# Patient Record
Sex: FEMALE | Race: WHITE | NOT HISPANIC OR LATINO
[De-identification: names, ages, dates, MRNs, and addresses within clinical notes are randomized per-mention and may not be internally consistent; named-entity substitution may affect disease eponyms.]

---

## 2019-03-07 PROBLEM — Z00.00 ENCOUNTER FOR PREVENTIVE HEALTH EXAMINATION: Status: ACTIVE | Noted: 2019-03-07

## 2019-03-08 ENCOUNTER — RECORD ABSTRACTING (OUTPATIENT)
Age: 69
End: 2019-03-08

## 2019-03-08 DIAGNOSIS — G43.909 MIGRAINE, UNSPECIFIED, NOT INTRACTABLE, W/OUT STATUS MIGRAINOSUS: ICD-10-CM

## 2019-03-08 DIAGNOSIS — Z80.8 FAMILY HISTORY OF MALIGNANT NEOPLASM OF OTHER ORGANS OR SYSTEMS: ICD-10-CM

## 2019-03-08 DIAGNOSIS — Z80.1 FAMILY HISTORY OF MALIGNANT NEOPLASM OF TRACHEA, BRONCHUS AND LUNG: ICD-10-CM

## 2019-03-08 DIAGNOSIS — F41.1 GENERALIZED ANXIETY DISORDER: ICD-10-CM

## 2019-03-08 DIAGNOSIS — Z80.3 FAMILY HISTORY OF MALIGNANT NEOPLASM OF BREAST: ICD-10-CM

## 2019-03-08 RX ORDER — ALBUTEROL SULFATE 90 UG/1
108 (90 BASE) AEROSOL, METERED RESPIRATORY (INHALATION)
Refills: 0 | Status: ACTIVE | COMMUNITY

## 2019-03-08 RX ORDER — TOPIRAMATE 25 MG/1
25 TABLET, COATED ORAL
Refills: 0 | Status: ACTIVE | COMMUNITY

## 2019-03-08 RX ORDER — ALPRAZOLAM 0.5 MG/1
0.5 TABLET ORAL
Refills: 0 | Status: ACTIVE | COMMUNITY

## 2019-03-08 RX ORDER — MOMETASONE FUROATE MONOHYDRATE 50 UG/1
50 SPRAY, METERED NASAL
Refills: 0 | Status: ACTIVE | COMMUNITY

## 2019-03-08 RX ORDER — ESCITALOPRAM OXALATE 10 MG/1
10 TABLET, FILM COATED ORAL
Refills: 0 | Status: ACTIVE | COMMUNITY

## 2019-03-08 RX ORDER — FLUTICASONE PROPIONATE 50 MCG
50 SPRAY, SUSPENSION NASAL
Refills: 0 | Status: ACTIVE | COMMUNITY

## 2019-03-12 ENCOUNTER — APPOINTMENT (OUTPATIENT)
Dept: GASTROENTEROLOGY | Facility: CLINIC | Age: 69
End: 2019-03-12
Payer: MEDICARE

## 2019-03-12 VITALS
SYSTOLIC BLOOD PRESSURE: 118 MMHG | HEART RATE: 66 BPM | DIASTOLIC BLOOD PRESSURE: 74 MMHG | HEIGHT: 65 IN | OXYGEN SATURATION: 97 %

## 2019-03-12 DIAGNOSIS — R10.32 RIGHT LOWER QUADRANT PAIN: ICD-10-CM

## 2019-03-12 DIAGNOSIS — Z83.79 FAMILY HISTORY OF OTHER DISEASES OF THE DIGESTIVE SYSTEM: ICD-10-CM

## 2019-03-12 DIAGNOSIS — Z87.891 PERSONAL HISTORY OF NICOTINE DEPENDENCE: ICD-10-CM

## 2019-03-12 DIAGNOSIS — R10.31 RIGHT LOWER QUADRANT PAIN: ICD-10-CM

## 2019-03-12 PROCEDURE — 36415 COLL VENOUS BLD VENIPUNCTURE: CPT

## 2019-03-12 PROCEDURE — 99204 OFFICE O/P NEW MOD 45 MIN: CPT | Mod: 25

## 2019-03-12 RX ORDER — LEVOTHYROXINE SODIUM 0.07 MG/1
75 TABLET ORAL
Refills: 0 | Status: ACTIVE | COMMUNITY

## 2019-03-12 RX ORDER — IBUPROFEN 200 MG
600 CAPSULE ORAL
Refills: 0 | Status: ACTIVE | COMMUNITY

## 2019-03-12 RX ORDER — LIOTHYRONINE SODIUM 5 UG/1
5 TABLET ORAL
Refills: 0 | Status: ACTIVE | COMMUNITY

## 2019-03-12 RX ORDER — KETOTIFEN FUMARATE 0.25 MG/ML
0.03 SOLUTION OPHTHALMIC
Refills: 0 | Status: DISCONTINUED | COMMUNITY
End: 2019-03-12

## 2019-03-13 ENCOUNTER — RESULT REVIEW (OUTPATIENT)
Age: 69
End: 2019-03-13

## 2019-03-13 LAB
ALBUMIN SERPL ELPH-MCNC: 4.5 G/DL
ALP BLD-CCNC: 94 U/L
ALT SERPL-CCNC: 12 U/L
ANION GAP SERPL CALC-SCNC: 11 MMOL/L
AST SERPL-CCNC: 22 U/L
BASOPHILS # BLD AUTO: 0.05 K/UL
BASOPHILS NFR BLD AUTO: 0.8 %
BILIRUB SERPL-MCNC: 0.5 MG/DL
BUN SERPL-MCNC: 16 MG/DL
CALCIUM SERPL-MCNC: 10 MG/DL
CHLORIDE SERPL-SCNC: 99 MMOL/L
CO2 SERPL-SCNC: 24 MMOL/L
CREAT SERPL-MCNC: 0.6 MG/DL
EOSINOPHIL # BLD AUTO: 0.04 K/UL
EOSINOPHIL NFR BLD AUTO: 0.7 %
HCT VFR BLD CALC: 38.8 %
HGB BLD-MCNC: 12 G/DL
IMM GRANULOCYTES NFR BLD AUTO: 0.2 %
LPL SERPL-CCNC: 41 U/L
LYMPHOCYTES # BLD AUTO: 1.12 K/UL
LYMPHOCYTES NFR BLD AUTO: 18.2 %
MAN DIFF?: NORMAL
MCHC RBC-ENTMCNC: 29.1 PG
MCHC RBC-ENTMCNC: 30.9 GM/DL
MCV RBC AUTO: 93.9 FL
MONOCYTES # BLD AUTO: 0.5 K/UL
MONOCYTES NFR BLD AUTO: 8.1 %
NEUTROPHILS # BLD AUTO: 4.43 K/UL
NEUTROPHILS NFR BLD AUTO: 72 %
PLATELET # BLD AUTO: 241 K/UL
POTASSIUM SERPL-SCNC: 5.2 MMOL/L
PROT SERPL-MCNC: 7.5 G/DL
RBC # BLD: 4.13 M/UL
RBC # FLD: 13.3 %
SODIUM SERPL-SCNC: 134 MMOL/L
WBC # FLD AUTO: 6.15 K/UL

## 2019-03-15 NOTE — HISTORY OF PRESENT ILLNESS
[FreeTextEntry1] : 69 yo f. \par 2 mos ago, she developed severe abd pain, low abdomen/pelvis. \par she saw gyn\par told to take a lot of miralax\par abdominal and pelvic/TV US imaging showed kidney stones, and possible hepatic hemangiomas and myomatous uterus. \par and she was told to see GI. \par She saw Dr. Campo at [facility], \par scheduled for EGD March 23. \par She was told to take 32 g of fiber daily, 8 servings of benefiber and 1 serving of miralax daily x 2 weeks, This caused severe cramping, distension and diarrhea\par She had URI end of Jan/feb, severe coughing, \par She now cant lie on her stomach due to pain. \par She has tenderness around her naval and feels a bump above her naval. \par she had blood work with Dr. Campo, \par she tried low fodmap for last 1.5 months, cut out gluten/dairy, cruciferous vegetables. \par she has "always had stomach issues" and has not had red meat/fried foods since age 21. \par \par she has a h/o GERD. \par she called nutritoinist\par no prior egd\par colonoscopy dr. ziegler 2000, 2010, due in 10 years. \par fodmap helpful for bowel regularity, 1 bm per day, and GERD.  \par previously she had alternating constipation /diarrhea was her normal bowel pattern. \par no on fodmap diet, some urgency 1-3 bm per day, formed stool. \par no brbpr/melena\par weight stable, appetite stable. \par no nausea/vomting\par pain is better after bm.

## 2019-03-15 NOTE — ASSESSMENT
[FreeTextEntry1] : new constipation and bloating, unclear etiology.\par recommend CT and if unrevealing, EGD and colonoscopy. \par Risks (including but not limited to sedation risks, infection, bleeding, perforation, possibility of missed lesions), benefits and alternatives to procedure, including not doing the procedure, were discussed with patient. Patient understood and agreed to proceed with egd/colonoscopy. \par EGD/Colonoscopy preparation instructions reviewed with patient.\par Continue daily MiraLAX. \par

## 2019-03-15 NOTE — PHYSICAL EXAM
[General Appearance - Alert] : alert [General Appearance - In No Acute Distress] : in no acute distress [Sclera] : the sclera and conjunctiva were normal [Outer Ear] : the ears and nose were normal in appearance [Hearing Threshold Finger Rub Not [place]] : hearing was normal [Neck Appearance] : the appearance of the neck was normal [] : no respiratory distress [Apical Impulse] : the apical impulse was normal [Abdomen Soft] : soft [FreeTextEntry1] : diffuse mild tenderness to palpation, worse at umbilicus, small reducible umbilical hernia, no r/g [No CVA Tenderness] : no ~M costovertebral angle tenderness [Abnormal Walk] : normal gait [Skin Color & Pigmentation] : normal skin color and pigmentation [No Focal Deficits] : no focal deficits [Oriented To Time, Place, And Person] : oriented to person, place, and time

## 2019-03-15 NOTE — REASON FOR VISIT
[Consultation] : a consultation visit [FreeTextEntry1] : Patient seen at the request of Dr. Lopez for abdominal complaints

## 2019-03-20 ENCOUNTER — APPOINTMENT (OUTPATIENT)
Dept: GASTROENTEROLOGY | Facility: CLINIC | Age: 69
End: 2019-03-20

## 2020-05-08 ENCOUNTER — APPOINTMENT (OUTPATIENT)
Dept: GASTROENTEROLOGY | Facility: CLINIC | Age: 70
End: 2020-05-08
Payer: MEDICARE

## 2020-05-08 DIAGNOSIS — C56.9 MALIGNANT NEOPLASM OF UNSPECIFIED OVARY: ICD-10-CM

## 2020-05-08 DIAGNOSIS — K21.9 GASTRO-ESOPHAGEAL REFLUX DISEASE W/OUT ESOPHAGITIS: ICD-10-CM

## 2020-05-08 DIAGNOSIS — R19.7 DIARRHEA, UNSPECIFIED: ICD-10-CM

## 2020-05-08 PROCEDURE — 99214 OFFICE O/P EST MOD 30 MIN: CPT | Mod: 95

## 2020-05-08 NOTE — ASSESSMENT
[FreeTextEntry1] : 1. aubacute diarrhea, of recent onset, two weeks ago, with improvement, but not yet back to normal\par \par 2.  now perhaps one loose bm per day, maybe two, soft, unformed, no longer watery, and the bleeding times one was minor, and confined to the tissue\par \par 3.  patient's  is the only other family member, and he has been well\par \par 4.  appetite is ok, feels reasonably well, but appetite in general since debulking surgery is better\par \par had been on bland diet, as per nurse practitioner,  \par \par plan\par 1.  use of a probiotic, which was started five days ago..using one at [facility]\par \par 2.  gradually ease up on dietrary restrictions\par \par 3.  frequency of esophageal reflux has increased...it is experiennced frequently, bad reflux only once every six months, and mild reflux symptoms are experienced much more commonly, substernal upper substernal burning, and it is brought on coffee can do it, or tea, herbal tea, red hot peppers, onions garlic, etc.\par \par 4.  prilosec helped her reflux, ranitidine helped, but not as much\par \par 5.  gaviscon helps, and gets rid of symptoms rapidly.\par \par \par telehealth visit, thirty minutes

## 2020-05-08 NOTE — HISTORY OF PRESENT ILLNESS
[FreeTextEntry1] : THis is a [age] year old patient, with history of ovarian carcinoma and peritoneal carcinomatosis...\par \par referred by  Andre Mckinley, a patient\par \par last colonoscopy with me in 2010..\par \par dr Tez Mcdaniel diagnosed ovarian cancer and peritoneal carcinomatosis\par \par chemo done at [facility]..\par \par then a debulking surgery, in july 2019...\par \par doing very well..\par \par labs...\par ca123; 8, had been over 1000\par \par stool culture neg,\par and c diff was negative\par \par two weeks of diarrhea\par \par some bright red blood two days \par \par blood was on toilet tissue only..\par \par the diarrhea began as water, several days of about three bms per day, quite watery, tooik imodium then pepto and it made her constipated.\par \par magnesium tabs relieved her constipation..\par \par still some diarrhea, some cramps, yesterday a loose bm, today, minor, unformed by small volume..\par \par

## 2020-05-08 NOTE — REASON FOR VISIT
[Consultation] : a consultation visit [FreeTextEntry1] : patient referred/ self referred, with history of ovarian cancer and peritoneal carcinomatosis, now with gi symptoms which she would like to review;  Telehealth visit

## 2022-05-17 ENCOUNTER — TRANSCRIPTION ENCOUNTER (OUTPATIENT)
Age: 72
End: 2022-05-17

## 2022-05-26 ENCOUNTER — TRANSCRIPTION ENCOUNTER (OUTPATIENT)
Age: 72
End: 2022-05-26